# Patient Record
Sex: MALE | Race: AMERICAN INDIAN OR ALASKA NATIVE
[De-identification: names, ages, dates, MRNs, and addresses within clinical notes are randomized per-mention and may not be internally consistent; named-entity substitution may affect disease eponyms.]

---

## 2018-04-20 ENCOUNTER — HOSPITAL ENCOUNTER (EMERGENCY)
Dept: HOSPITAL 5 - ED | Age: 23
Discharge: LEFT BEFORE DISCHARGE | End: 2018-04-20
Payer: COMMERCIAL

## 2018-04-20 VITALS — SYSTOLIC BLOOD PRESSURE: 142 MMHG | DIASTOLIC BLOOD PRESSURE: 78 MMHG

## 2018-04-20 DIAGNOSIS — F41.0: ICD-10-CM

## 2018-04-20 DIAGNOSIS — F41.9: Primary | ICD-10-CM

## 2018-04-20 DIAGNOSIS — F31.9: ICD-10-CM

## 2018-04-20 DIAGNOSIS — F17.200: ICD-10-CM

## 2018-04-20 DIAGNOSIS — F12.10: ICD-10-CM

## 2018-04-20 PROCEDURE — 93005 ELECTROCARDIOGRAM TRACING: CPT

## 2018-04-20 PROCEDURE — 93010 ELECTROCARDIOGRAM REPORT: CPT

## 2018-04-20 PROCEDURE — 99282 EMERGENCY DEPT VISIT SF MDM: CPT

## 2018-04-20 NOTE — EMERGENCY DEPARTMENT REPORT
ED Dizziness HPI





- General


Chief Complaint: Dizziness


Stated Complaint: LIGHTHEADED


Time Seen by Provider: 04/20/18 17:04


Source: patient


Mode of arrival: Ambulatory


Limitations: No Limitations





- History of Present Illness


Initial Comments: 


22-year-old male with no significant past medical history came in complaining 

of dizziness for past 2 days.  Patient states that he has not eaten anything 

the past 72 hours and not feeling hungry.  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 

after work last night he had one sandwich and then he's smoked marijuana and 

then he started feeling a little dizzy.  At time of examination, patient is 

under no acute distress.  Patient denies nausea vomiting chest pain shortness 

of breath.  Patient denies fever chills.  Patient denies any neck pain neck 

stiffness.  Patient states he wants to eat something now and wants to leave and 

is refusing blood work and CAT scan and chest x-ray.





-: Gradual, days(s) (2 days)


Timing: gradual onset


Description: lightheadedness


History of Same: Yes


History of Trauma: No


Severity: mild


Improves With: remaining still


Worsens With: movement


Associated Symptoms: denies: ataxia, chest pain, confusion, diaphoresis, fever/

chills, loss of appetite, malaise, seizure, shortness of breath, syncope, 

weakness





- Related Data


 Allergies











Allergy/AdvReac Type Severity Reaction Status Date / Time


 


No Known Allergies Allergy   Unverified 04/20/18 12:48














ED Review of Systems


ROS: 


Stated complaint: LIGHTHEADED


Other details as noted in HPI





Constitutional: denies: chills, fever


Eyes: denies: eye pain, eye discharge, vision change


ENT: denies: ear pain, throat pain


Respiratory: denies: cough, shortness of breath, wheezing


Cardiovascular: denies: chest pain, palpitations


Endocrine: no symptoms reported


Gastrointestinal: denies: abdominal pain, nausea, diarrhea


Genitourinary: denies: urgency, dysuria


Musculoskeletal: denies: back pain, joint swelling, arthralgia


Skin: denies: rash, lesions


Neurological: denies: headache, weakness, paresthesias


Psychiatric: denies: anxiety, depression


Hematological/Lymphatic: denies: easy bleeding, easy bruising





ED Past Medical Hx





- Past Medical History


Hx Psychiatric Treatment: Yes (panic attacks,anxiety,bipolar,depression)





- Surgical History


Past Surgical History?: No





- Social History


Smoking Status: Current Every Day Smoker


Substance Use Type: Alcohol, Marijuana, Other





ED Physical Exam





- General


Limitations: No Limitations





- Head


Head exam: Present: atraumatic





- Eye


Eye exam: Present: normal appearance





- ENT


ENT exam: Present: mucous membranes moist





- Neck


Neck exam: Present: normal inspection





- Respiratory


Respiratory exam: Present: normal lung sounds bilaterally.  Absent: respiratory 

distress





- Cardiovascular


Cardiovascular Exam: Present: regular rate, normal rhythm.  Absent: systolic 

murmur, diastolic murmur, rubs, gallop





- GI/Abdominal


GI/Abdominal exam: Present: soft, normal bowel sounds.  Absent: distended, 

tenderness, guarding, rebound, rigid, organomegaly, mass, bruit, pulsatile mass

, hernia





- Rectal


Rectal exam: Present: deferred





- Extremities Exam


Extremities exam: Present: normal inspection





- Back Exam


Back exam: Present: normal inspection





- Neurological Exam


Neurological exam: Present: alert





- Psychiatric


Psychiatric exam: Present: normal affect, normal mood





- Skin


Skin exam: Present: warm, dry, intact, normal color.  Absent: rash





ED Course





 Vital Signs











  04/20/18





  12:44


 


Temperature 97.5 F L


 


Pulse Rate 68


 


Respiratory 18





Rate 


 


Blood Pressure 145/85


 


O2 Sat by Pulse 100





Oximetry 














ED Medical Decision Making





- Medical Decision Making


22-year-old male with no significant past medical clinic complaining of 

dizziness for 2 days.  Patient says he has not eaten anything for the past 72 

hours.  He feels dizzy.  Said that he smoked marijuana last night after 

delivering pizzas.  Patient is refusing blood work and CAT scan chest x-ray and 

patient wants to sign out AMA.  All risks including bleed, stroke, death 

explained.  Patient is alert awake oriented 3 and is able to make his own 

decisions at the time of disposition.  Patient will sign out AMA and does not 

want any blood work or CAT scan or chest x-ray done.  I did offer patient some 

food and he will eat some food and go home.





Critical care attestation.: 


If time is entered above; I have spent that time in minutes in the direct care 

of this critically ill patient, excluding procedure time.








ED Disposition


Clinical Impression: 


 Dizziness, Anxiety, Left against medical advice





Disposition: DC-07 LEFT AGAINST MED ADVICE


Is pt being admited?: No


Does the pt Need Aspirin: No


Condition: Stable


Additional Instructions: 


please followup with PMD in 1-2 days without fail to reevaluate and treatment


Referrals: 


YARELIS RANGEL MD [Primary Care Provider] - 3-5 Days


ANGELITA VILLALTA MD [Staff Physician] - 3-5 Days


Forms:  AMA Form